# Patient Record
Sex: FEMALE | Race: WHITE | NOT HISPANIC OR LATINO | Employment: FULL TIME | URBAN - METROPOLITAN AREA
[De-identification: names, ages, dates, MRNs, and addresses within clinical notes are randomized per-mention and may not be internally consistent; named-entity substitution may affect disease eponyms.]

---

## 2024-02-06 ENCOUNTER — OFFICE VISIT (OUTPATIENT)
Dept: CARDIOLOGY CLINIC | Facility: CLINIC | Age: 69
End: 2024-02-06
Payer: COMMERCIAL

## 2024-02-06 VITALS
HEART RATE: 72 BPM | SYSTOLIC BLOOD PRESSURE: 160 MMHG | WEIGHT: 113.5 LBS | DIASTOLIC BLOOD PRESSURE: 90 MMHG | BODY MASS INDEX: 18.91 KG/M2 | HEIGHT: 65 IN

## 2024-02-06 DIAGNOSIS — I71.21 ANEURYSM OF ASCENDING AORTA WITHOUT RUPTURE (HCC): Primary | ICD-10-CM

## 2024-02-06 PROCEDURE — 93000 ELECTROCARDIOGRAM COMPLETE: CPT | Performed by: INTERNAL MEDICINE

## 2024-02-06 PROCEDURE — 99203 OFFICE O/P NEW LOW 30 MIN: CPT | Performed by: INTERNAL MEDICINE

## 2024-02-06 RX ORDER — AZITHROMYCIN 250 MG/1
TABLET, FILM COATED ORAL
COMMUNITY
Start: 2024-02-01 | End: 2024-02-06

## 2024-02-06 NOTE — PROGRESS NOTES
Cardiology Follow Up    Ignacia Del Rosario  1955  [id number]  [facility] [place]  [address] [address] [phone]    1. Aneurysm of ascending aorta without rupture (HCC)  POCT ECG    Echo complete w/ contrast if indicated    Lipid panel    CT chest w contrast          Discussion: We will obtain a follow-up CT scan with contrast in 6 months.  We will also obtain an echo to look for a bicuspid valve and also to make sure there is no structural disease to account for her left axis deviation and ST segment deviation on ECG.  I suspect that these are all benign.  A lipid panel will also be obtained.  I will see him in 6 months after the CT scan.    Cardiovascular History:   Ms. Del Rosario recently underwent CT scan of the chest because of exposure to silica and was found to have dilation of the ascending aorta to 4.5 cm on CT scan done at [facility].  She believes that a number of years ago a scan was performed that disclosed normal aortic size, but records of that study are not available.  She denies chest discomfort or dyspnea.  She has been quite active in the past but has not exercised regularly in the past several years.  She does not have hypertension, diabetes, and has never smoked.  Her lipid status is not known.  There is a history of palpitations in the past.  Evaluation was apparently unremarkable. Her baseline ECG did show left axis deviation and nonspecific ST segment changes.  Pretty impressive recently had valley sock  She does have a history of Raynaud's.  She is not currently on any medication.    She is under a great deal of stress at work and had extensive legal issues with an ex- that caused her significant financial stress.    Patient Active Problem List   Diagnosis    Aneurysm of ascending aorta without rupture (HCC)     No past medical history on file.  Social History      Socioeconomic History    Marital status:      Spouse name: Not on file    Number of children: Not on file    Years of education: Not on file    Highest education level: Not on file   Occupational History    Not on file   Tobacco Use    Smoking status: Not on file    Smokeless tobacco: Not on file   Substance and Sexual Activity    Alcohol use: Not on file    Drug use: Not on file    Sexual activity: Not on file   Other Topics Concern    Not on file   Social History Narrative    Not on file     Social Determinants of Health     Financial Resource Strain: Not on file   Food Insecurity: Not on file   Transportation Needs: Not on file   Physical Activity: Not on file   Stress: Not on file   Social Connections: Not on file   Intimate Partner Violence: Not on file   Housing Stability: Not on file      No family history on file.  No past surgical history on file.    Current Outpatient Medications:     azithromycin (ZITHROMAX) 250 mg tablet, Take 2 tablets by mouth on day one; then one tablet daily for 4 days. (Patient not taking: Reported on 2/6/2024), Disp: , Rfl:   Allergies   Allergen Reactions    Penicillins Edema and Swelling    Tetracycline Hives     Other reaction(s): permanent-skin changes-spots    Ciprofloxacin Swelling and Rash       Imaging: No results found.    Review of Systems:  ROS    Vitals:    02/06/24 1553   BP: 160/90   Pulse: 72     Weight (last 2 days)       Date/Time Weight    02/06/24 1553 51.5 (113.5)            Physical Exam:  Physical Exam    Ruiz Giordano MD

## 2024-04-22 ENCOUNTER — APPOINTMENT (EMERGENCY)
Dept: CT IMAGING | Facility: HOSPITAL | Age: 69
End: 2024-04-22
Payer: COMMERCIAL

## 2024-04-22 ENCOUNTER — HOSPITAL ENCOUNTER (EMERGENCY)
Facility: HOSPITAL | Age: 69
Discharge: HOME | End: 2024-04-22
Attending: EMERGENCY MEDICINE
Payer: COMMERCIAL

## 2024-04-22 VITALS
RESPIRATION RATE: 16 BRPM | SYSTOLIC BLOOD PRESSURE: 151 MMHG | DIASTOLIC BLOOD PRESSURE: 90 MMHG | OXYGEN SATURATION: 100 % | TEMPERATURE: 98.1 F | HEART RATE: 65 BPM

## 2024-04-22 DIAGNOSIS — R03.0 ELEVATED BLOOD PRESSURE READING: ICD-10-CM

## 2024-04-22 DIAGNOSIS — R10.9 ACUTE ABDOMINAL PAIN: Primary | ICD-10-CM

## 2024-04-22 LAB
ALBUMIN SERPL BCP-MCNC: 4.3 G/DL (ref 3.5–5)
ALP SERPL-CCNC: 65 U/L (ref 34–104)
ALT SERPL W P-5'-P-CCNC: 14 U/L (ref 7–52)
ANION GAP SERPL CALCULATED.3IONS-SCNC: 9 MMOL/L (ref 4–13)
AST SERPL W P-5'-P-CCNC: 27 U/L (ref 13–39)
ATRIAL RATE: 69 BPM
BACTERIA UR QL AUTO: NORMAL /HPF
BASOPHILS # BLD AUTO: 0.03 THOUSANDS/ÂΜL (ref 0–0.1)
BASOPHILS NFR BLD AUTO: 0 % (ref 0–1)
BILIRUB SERPL-MCNC: 0.58 MG/DL (ref 0.2–1)
BILIRUB UR QL STRIP: NEGATIVE
BUN SERPL-MCNC: 18 MG/DL (ref 5–25)
CALCIUM SERPL-MCNC: 9.6 MG/DL (ref 8.4–10.2)
CHLORIDE SERPL-SCNC: 103 MMOL/L (ref 96–108)
CLARITY UR: CLEAR
CO2 SERPL-SCNC: 26 MMOL/L (ref 21–32)
COLOR UR: ABNORMAL
CREAT SERPL-MCNC: 0.89 MG/DL (ref 0.6–1.3)
EOSINOPHIL # BLD AUTO: 0.03 THOUSAND/ÂΜL (ref 0–0.61)
EOSINOPHIL NFR BLD AUTO: 0 % (ref 0–6)
ERYTHROCYTE [DISTWIDTH] IN BLOOD BY AUTOMATED COUNT: 12.7 % (ref 11.6–15.1)
GFR SERPL CREATININE-BSD FRML MDRD: 67 ML/MIN/1.73SQ M
GLUCOSE SERPL-MCNC: 80 MG/DL (ref 65–140)
GLUCOSE UR STRIP-MCNC: NEGATIVE MG/DL
HCT VFR BLD AUTO: 38.2 % (ref 34.8–46.1)
HGB BLD-MCNC: 12.3 G/DL (ref 11.5–15.4)
HGB UR QL STRIP.AUTO: NEGATIVE
IMM GRANULOCYTES # BLD AUTO: 0.02 THOUSAND/UL (ref 0–0.2)
IMM GRANULOCYTES NFR BLD AUTO: 0 % (ref 0–2)
KETONES UR STRIP-MCNC: NEGATIVE MG/DL
LEUKOCYTE ESTERASE UR QL STRIP: NEGATIVE
LYMPHOCYTES # BLD AUTO: 1.73 THOUSANDS/ÂΜL (ref 0.6–4.47)
LYMPHOCYTES NFR BLD AUTO: 24 % (ref 14–44)
MCH RBC QN AUTO: 32.3 PG (ref 26.8–34.3)
MCHC RBC AUTO-ENTMCNC: 32.2 G/DL (ref 31.4–37.4)
MCV RBC AUTO: 100 FL (ref 82–98)
MONOCYTES # BLD AUTO: 0.45 THOUSAND/ÂΜL (ref 0.17–1.22)
MONOCYTES NFR BLD AUTO: 6 % (ref 4–12)
NEUTROPHILS # BLD AUTO: 5.05 THOUSANDS/ÂΜL (ref 1.85–7.62)
NEUTS SEG NFR BLD AUTO: 70 % (ref 43–75)
NITRITE UR QL STRIP: NEGATIVE
NON-SQ EPI CELLS URNS QL MICRO: NORMAL /HPF
NRBC BLD AUTO-RTO: 0 /100 WBCS
P AXIS: 64 DEGREES
PH UR STRIP.AUTO: 5.5 [PH]
PLATELET # BLD AUTO: 225 THOUSANDS/UL (ref 149–390)
PMV BLD AUTO: 8.8 FL (ref 8.9–12.7)
POTASSIUM SERPL-SCNC: 3.6 MMOL/L (ref 3.5–5.3)
PR INTERVAL: 148 MS
PROT SERPL-MCNC: 7 G/DL (ref 6.4–8.4)
PROT UR STRIP-MCNC: ABNORMAL MG/DL
QRS AXIS: -69 DEGREES
QRSD INTERVAL: 90 MS
QT INTERVAL: 418 MS
QTC INTERVAL: 447 MS
RBC # BLD AUTO: 3.81 MILLION/UL (ref 3.81–5.12)
RBC #/AREA URNS AUTO: NORMAL /HPF
SODIUM SERPL-SCNC: 138 MMOL/L (ref 135–147)
SP GR UR STRIP.AUTO: 1.02 (ref 1–1.03)
T WAVE AXIS: 15 DEGREES
UROBILINOGEN UR STRIP-ACNC: <2 MG/DL
VENTRICULAR RATE: 69 BPM
WBC # BLD AUTO: 7.31 THOUSAND/UL (ref 4.31–10.16)
WBC #/AREA URNS AUTO: NORMAL /HPF

## 2024-04-22 PROCEDURE — 99285 EMERGENCY DEPT VISIT HI MDM: CPT | Performed by: EMERGENCY MEDICINE

## 2024-04-22 PROCEDURE — 80053 COMPREHEN METABOLIC PANEL: CPT | Performed by: EMERGENCY MEDICINE

## 2024-04-22 PROCEDURE — 99284 EMERGENCY DEPT VISIT MOD MDM: CPT

## 2024-04-22 PROCEDURE — 81001 URINALYSIS AUTO W/SCOPE: CPT | Performed by: EMERGENCY MEDICINE

## 2024-04-22 PROCEDURE — 85025 COMPLETE CBC W/AUTO DIFF WBC: CPT | Performed by: EMERGENCY MEDICINE

## 2024-04-22 PROCEDURE — 36415 COLL VENOUS BLD VENIPUNCTURE: CPT | Performed by: EMERGENCY MEDICINE

## 2024-04-22 PROCEDURE — 93005 ELECTROCARDIOGRAM TRACING: CPT

## 2024-04-22 PROCEDURE — 93010 ELECTROCARDIOGRAM REPORT: CPT | Performed by: INTERNAL MEDICINE

## 2024-04-22 PROCEDURE — 74177 CT ABD & PELVIS W/CONTRAST: CPT

## 2024-04-22 RX ADMIN — IOHEXOL 70 ML: 350 INJECTION, SOLUTION INTRAVENOUS at 10:03

## 2024-04-22 NOTE — DISCHARGE INSTRUCTIONS
You were evaluated in the emergency department for: abdominal pain. You can access your current and pending results through [facility]'s [employer]. A radiologist will take a second look at your X-Rays, if you had any, and you will be contacted with any new findings.     You should follow-up with your primary care provider, as soon as possible, for re-evaluation.  If you do not have a primary care provider, I have referred you to [facility]. You will be contacted about scheduling an appointment. Their phone number is also included on this paperwork.     Your workup revealed no emergent features at this time; however, many disease processes are dynamic:    Please, return to the emergency department if you experience new or worsening symptoms, fever, chest pain, shortness of breath, difficulty breathing, dizziness, abdominal pain, persistent nausea/vomiting, syncope or passing out, blood in your urine or stool, coughing up blood, leg swelling/pain, urinary retention, bowel or bladder incontinence, numbness between your legs.    You may take 650mg of tylenol every four to six hours, not exceeding 3,000mg daily, for the management of your discomfort. You may also take ibuprofen, 400mg every six to eight hours.      Additionally, your blood pressure was measured to be high. This is something that you should discuss with your primary care provider and have re-checked within one week.

## 2024-04-22 NOTE — ED PROVIDER NOTES
History  Chief Complaint   Patient presents with    Abdominal Pain     Pt reports low abd pain that feels like gas since last PM but normal gas relief efforts have not helped     Patient is a 69-year-old female, with a history significant for IBS per my review of medical record, who presents to the ED today for evaluation of atraumatic, nonmigratory, nonradiating, sudden onset, sharp in character, right lower quadrant abdominal pain.  There is associated sensation of gas/increased burping.  Patient continues to pass flatus.  Patient states she still has her appendix.  Patient attempted Gas-X to remit her symptoms without effect.  Touch exacerbates her symptoms.  There is no associated fever, dyspnea, chest pain, dysuria, polyuria, back pain.  Patient is without other concerns at this time.        None       History reviewed. No pertinent past medical history.    History reviewed. No pertinent surgical history.    History reviewed. No pertinent family history.  I have reviewed and agree with the history as documented.    E-Cigarette/Vaping     E-Cigarette/Vaping Substances     Social History     Tobacco Use    Smoking status: Never    Smokeless tobacco: Never       Review of Systems   Constitutional:  Negative for fever.   HENT:  Negative for trouble swallowing.    Eyes:  Negative for visual disturbance.   Respiratory:  Negative for shortness of breath.    Cardiovascular:  Negative for chest pain.   Gastrointestinal:  Positive for abdominal pain. Negative for blood in stool.   Endocrine: Negative for polyuria.   Genitourinary:  Negative for dysuria.   Musculoskeletal:  Negative for back pain and gait problem.   Skin:  Negative for rash.   Allergic/Immunologic: Positive for environmental allergies.   Neurological:  Negative for weakness and numbness.   Hematological:  Negative for adenopathy.   Psychiatric/Behavioral:  Negative for confusion.    All other systems reviewed and are negative.      Physical Exam  Physical  Exam  Vitals and nursing note reviewed.   Constitutional:       General: She is not in acute distress.     Appearance: She is not toxic-appearing.   HENT:      Head: Normocephalic and atraumatic.      Right Ear: External ear normal.      Left Ear: External ear normal.      Nose: Nose normal. No rhinorrhea.      Mouth/Throat:      Mouth: Mucous membranes are moist.      Pharynx: Oropharynx is clear. No oropharyngeal exudate or posterior oropharyngeal erythema.      Comments: Uvula midline. No oropharyngeal or submandibular mass/swelling  Eyes:      General:         Right eye: No discharge.         Left eye: No discharge.   Neck:      Comments: Patient is spontaneously rotating their neck to the left and right during the history and physical exam interaction without difficulty or apparent discomfort    Cardiovascular:      Rate and Rhythm: Normal rate and regular rhythm.      Pulses: Normal pulses.      Heart sounds: Normal heart sounds. No murmur heard.     No friction rub. No gallop.      Comments: 2+ Radial  Pulmonary:      Effort: Pulmonary effort is normal. No respiratory distress.      Breath sounds: Normal breath sounds. No stridor. No wheezing, rhonchi or rales.   Abdominal:      General: Abdomen is flat. There is no distension.      Palpations: Abdomen is soft.      Tenderness: There is abdominal tenderness. There is no right CVA tenderness, left CVA tenderness, guarding or rebound.      Comments: Tenderness palpation over McBurney's point in the suprapubic region   Musculoskeletal:         General: No deformity.      Cervical back: Neck supple. No tenderness. No muscular tenderness.   Lymphadenopathy:      Cervical: No cervical adenopathy.   Skin:     General: Skin is warm and dry.      Capillary Refill: Capillary refill takes less than 2 seconds.   Neurological:      Mental Status: She is alert.      Comments: Patient is speaking clearly in complete sentences.  Patient is answering appropriately and able  follow commands.  Patient is moving all four extremities spontaneously.  No facial droop.  Tongue midline.      Psychiatric:         Mood and Affect: Mood normal.         Behavior: Behavior normal.         Vital Signs  ED Triage Vitals   Temperature Pulse Respirations Blood Pressure SpO2   04/22/24 0812 04/22/24 0809 04/22/24 0809 04/22/24 0809 04/22/24 0809   98.1 °F (36.7 °C) 86 16 (!) 178/107 99 %      Temp Source Heart Rate Source Patient Position - Orthostatic VS BP Location FiO2 (%)   04/22/24 0812 04/22/24 0930 04/22/24 0809 04/22/24 0809 --   Oral Monitor Sitting Right arm       Pain Score       --                  Vitals:    04/22/24 0809 04/22/24 0930   BP: (!) 178/107 151/90   Pulse: 86 65   Patient Position - Orthostatic VS: Sitting          Visual Acuity      ED Medications  Medications   iohexol (OMNIPAQUE) 350 MG/ML injection (MULTI-DOSE) 70 mL (70 mL Intravenous Given 4/22/24 1003)       Diagnostic Studies  Results Reviewed       Procedure Component Value Units Date/Time    Comprehensive metabolic panel [[id number]] Collected: 04/22/24 0900    Lab Status: Final result Specimen: Blood from Arm, Left Updated: 04/22/24 0945     Sodium 138 mmol/L      Potassium 3.6 mmol/L      Chloride 103 mmol/L      CO2 26 mmol/L      ANION GAP 9 mmol/L      BUN 18 mg/dL      Creatinine 0.89 mg/dL      Glucose 80 mg/dL      Calcium 9.6 mg/dL      AST 27 U/L      ALT 14 U/L      Alkaline Phosphatase 65 U/L      Total Protein 7.0 g/dL      Albumin 4.3 g/dL      Total Bilirubin 0.58 mg/dL      eGFR 67 ml/min/1.73sq m     Narrative:      National Kidney Disease Foundation guidelines for Chronic Kidney Disease (CKD):     Stage 1 with normal or high GFR (GFR > 90 mL/min/1.73 square meters)    Stage 2 Mild CKD (GFR = 60-89 mL/min/1.73 square meters)    Stage 3A Moderate CKD (GFR = 45-59 mL/min/1.73 square meters)    Stage 3B Moderate CKD (GFR = 30-44 mL/min/1.73 square meters)    Stage 4 Severe CKD (GFR = 15-29 mL/min/1.73  square meters)    Stage 5 End Stage CKD (GFR <15 mL/min/1.73 square meters)  Note: GFR calculation is accurate only with a steady state creatinine    CBC and differential [[id number]]  (Abnormal) Collected: 04/22/24 0900    Lab Status: Final result Specimen: Blood from Arm, Left Updated: 04/22/24 0928     WBC 7.31 Thousand/uL      RBC 3.81 Million/uL      Hemoglobin 12.3 g/dL      Hematocrit 38.2 %       fL      MCH 32.3 pg      MCHC 32.2 g/dL      RDW 12.7 %      MPV 8.8 fL      Platelets 225 Thousands/uL      nRBC 0 /100 WBCs      Segmented % 70 %      Immature Grans % 0 %      Lymphocytes % 24 %      Monocytes % 6 %      Eosinophils Relative 0 %      Basophils Relative 0 %      Absolute Neutrophils 5.05 Thousands/µL      Absolute Immature Grans 0.02 Thousand/uL      Absolute Lymphocytes 1.73 Thousands/µL      Absolute Monocytes 0.45 Thousand/µL      Eosinophils Absolute 0.03 Thousand/µL      Basophils Absolute 0.03 Thousands/µL     Urine Microscopic [[id number]]  (Normal) Collected: 04/22/24 0853    Lab Status: Final result Specimen: Urine, Clean Catch Updated: 04/22/24 0923     RBC, UA 1-2 /hpf      WBC, UA 1-2 /hpf      Epithelial Cells Occasional /hpf      Bacteria, UA None Seen /hpf     UA w Reflex to Microscopic w Reflex to Culture [[id number]]  (Abnormal) Collected: 04/22/24 0853    Lab Status: Final result Specimen: Urine, Clean Catch Updated: 04/22/24 0923     Color, UA Light Yellow     Clarity, UA Clear     Specific Gravity, UA 1.018     pH, UA 5.5     Leukocytes, UA Negative     Nitrite, UA Negative     Protein, UA 50 (1+) mg/dl      Glucose, UA Negative mg/dl      Ketones, UA Negative mg/dl      Urobilinogen, UA <2.0 mg/dl      Bilirubin, UA Negative     Occult Blood, UA Negative                   CT abdomen pelvis with contrast   Final Result by Manuelito Hardy MD (04/22 1025)      No acute abnormality in the abdomen or pelvis.         Workstation performed: [id number]                     Procedures  Procedures         ED Course  ED Course as of 04/22/24 1546   Mon Apr 22, 2024   0859 ECG per my independent interpretation: Normal rate, regular rhythm, no ectopy, left axis, no ST elevations    0925 Bacteria, UA: None Seen  WNL   1050 The patient reports improvement in symptoms on reevaluation.  Questions answered to her satisfaction.  Patient states she has no PCP: Referral placed                               SBIRT 22yo+      Flowsheet Row Most Recent Value   Initial Alcohol Screen: US AUDIT-C     1. How often do you have a drink containing alcohol? 0 Filed at: 04/22/2024 0910   2. How many drinks containing alcohol do you have on a typical day you are drinking?  0 Filed at: 04/22/2024 0910   3b. FEMALE Any Age, or MALE 65+: How often do you have 4 or more drinks on one occassion? 0 Filed at: 04/22/2024 0910   Audit-C Score 0 Filed at: 04/22/2024 0910   DINO: How many times in the past year have you...    Used an illegal drug or used a prescription medication for non-medical reasons? Never Filed at: 04/22/2024 0910                      Medical Decision Making  Patient is a 69-year-old female, with a history significant for IBS per my review of medical record, who presents to the ED today for evaluation of atraumatic, nonmigratory, nonradiating, sudden onset, sharp in character, right lower quadrant abdominal pain.  There is associated sensation of gas/increased burping.  Patient continues to pass flatus.  Patient states she still has her appendix.  Patient attempted Gas-X to remit her symptoms without effect.  Touch exacerbates her symptoms.  There is no associated fever, dyspnea, chest pain, dysuria, polyuria, back pain.  Patient is currently afebrile and hemodynamically stable.  Her physical exam is notable for clear heart and lungs, soft abdomen with tenderness in the right lower quadrant.  This presentation is concerning for: Acute appendicitis, anxiety, IBS.  I also considered AAA.  Low  suspicion for ovarian pathology, SBO at this time based on history physical exam.  Patient at risk for UTI.  Will investigate with CT of abdomen pelvis, ECG, CBC, CMP, UA.  Will manage based on workup as patient does not desire analgesia at this time.    Amount and/or Complexity of Data Reviewed  Labs: ordered. Decision-making details documented in ED Course.  Radiology: ordered.    Risk  Prescription drug management.             Disposition  Final diagnoses:   Acute abdominal pain   Elevated blood pressure reading     Time reflects when diagnosis was documented in both MDM as applicable and the Disposition within this note       Time User Action Codes Description Comment    4/22/2024  8:43 AM Cisco Hickey Add [R10.9] Acute abdominal pain     4/22/2024  8:43 AM Cisco Hickey Add [R03.0] Elevated blood pressure reading           ED Disposition       ED Disposition   Discharge    Condition   Stable    Date/Time   Mon Apr 22, 2024 1030    Comment   Ignacia Del Rosario discharge to home/self care.                   Follow-up Information       Follow up With Specialties Details Why Contact Info Additional Information    [facility] Schedule an appointment as soon as possible for a visit   [address] [address] [phone] [facility], [address], [place], [address]   [phone]    [facility] Schedule an appointment as soon as possible for a visit   [address] [address] [phone] [facility], [address], [place], [address], [phone]            There are no discharge medications for this patient.          PDMP Review       None            ED Provider  Electronically Signed by              Cisco Hickey MD  04/22/24 9212

## 2024-04-22 NOTE — ED NOTES
Pt educated to take urine sample cup to restroom d/t needing to urinate. Pt did not take cup to bathroom with her.      Aleshia Nettles RN  04/22/24 8950

## 2024-04-22 NOTE — Clinical Note
Ignacia Del Rosario was seen and treated in our emergency department on 4/22/2024.                Diagnosis:     Ignacia  is off the rest of the shift today.    She may return on this date:          If you have any questions or concerns, please don't hesitate to call.      Cisco Hickey MD    ______________________________           _______________          _______________  Hospital Representative                              Date                                Time

## 2024-04-30 ENCOUNTER — NEW PATIENT COMPREHENSIVE (OUTPATIENT)
Dept: URBAN - METROPOLITAN AREA CLINIC 6 | Facility: CLINIC | Age: 69
End: 2024-04-30

## 2024-04-30 DIAGNOSIS — H43.811: ICD-10-CM

## 2024-04-30 DIAGNOSIS — H40.013: ICD-10-CM

## 2024-04-30 DIAGNOSIS — H53.19: ICD-10-CM

## 2024-04-30 PROCEDURE — 92004 COMPRE OPH EXAM NEW PT 1/>: CPT

## 2024-04-30 PROCEDURE — 92250 FUNDUS PHOTOGRAPHY W/I&R: CPT

## 2024-04-30 ASSESSMENT — TONOMETRY
OD_IOP_MMHG: 22
OS_IOP_MMHG: 22
OD_IOP_MMHG: 24
OS_IOP_MMHG: 21

## 2024-04-30 ASSESSMENT — VISUAL ACUITY
OD_CC: 20/25-1
OS_CC: 20/20

## 2024-06-07 ENCOUNTER — APPOINTMENT (OUTPATIENT)
Dept: RADIOLOGY | Facility: AMBULARY SURGERY CENTER | Age: 69
End: 2024-06-07
Attending: ORTHOPAEDIC SURGERY
Payer: COMMERCIAL

## 2024-06-07 ENCOUNTER — OFFICE VISIT (OUTPATIENT)
Dept: OBGYN CLINIC | Facility: CLINIC | Age: 69
End: 2024-06-07
Payer: COMMERCIAL

## 2024-06-07 VITALS — WEIGHT: 113 LBS | HEIGHT: 65 IN | BODY MASS INDEX: 18.83 KG/M2

## 2024-06-07 DIAGNOSIS — M79.642 LEFT HAND PAIN: ICD-10-CM

## 2024-06-07 DIAGNOSIS — M79.642 LEFT HAND PAIN: Primary | ICD-10-CM

## 2024-06-07 DIAGNOSIS — M06.9 RHEUMATOID ARTHRITIS INVOLVING LEFT HAND, UNSPECIFIED WHETHER RHEUMATOID FACTOR PRESENT (HCC): ICD-10-CM

## 2024-06-07 PROCEDURE — 99204 OFFICE O/P NEW MOD 45 MIN: CPT | Performed by: ORTHOPAEDIC SURGERY

## 2024-06-07 PROCEDURE — 73130 X-RAY EXAM OF HAND: CPT

## 2024-06-07 NOTE — PROGRESS NOTES
Assessment:  1. Left hand pain  XR hand 3+ vw left    Ambulatory Referral to Rheumatology      2. Rheumatoid arthritis involving left hand, unspecified whether rheumatoid factor present (HCC)  C-reactive protein    Sedimentation rate, automated    Cyclic citrul peptide antibody, IgG    Ambulatory Referral to Rheumatology        Patient Active Problem List   Diagnosis    Aneurysm of ascending aorta without rupture (HCC)           Plan      69 y.o. female with left MCP joint swelling without mechanism of injury, suggestive of Rheumatoid Arthritis  Diagnostics reviewed and physical exam performed.  Diagnosis, treatment options and associated risks were discussed with the patient including no treatment, nonsurgical treatment and potential for surgical intervention.  The patient was given the opportunity to ask questions regarding each.  Lab work ordered to evaluate for inflammatory condition  Referral placed today for Ignacia to follow up with Rheumatology for further evaluation and treatment  Continue with activities as tolerated using pain as a guide for when to modify or discontinue  Return for follow up on an as-needed basis (PRN).          Subjective:     Patient ID: Ignacia Del Rosario 69 y.o. female    HPI    Patient presents today for initial evaluation of left hand swelling and pain that began a few months ago with no injury, trauma, and treatment.  She notices patient is aggravated with increased activity.  Denies previous injury, surgery, trauma to the left hand.  She notes intermittent cracking sensation that is nonpainful.  Denies paresthesias.  Does not use any brace or assistive device on the hand. She is left hand dominant. She notes she is generally very active with heavy use of her hands. She has a history of other autoimmune disorders and her mother has a history of Rheumatoid arthritis, and wonders if this hand swelling is related to that.     The following portions of the patient's history were reviewed  "and updated as appropriate: allergies, current medications, past family history, past social history, past surgical history and problem list.      Objective:    Review of Systems  Pertinent items are noted in HPI.  All other systems were reviewed and are negative.    History reviewed. No pertinent past medical history.    History reviewed. No pertinent surgical history.    History reviewed. No pertinent family history.    Social History     Occupational History    Not on file   Tobacco Use    Smoking status: Never    Smokeless tobacco: Never   Substance and Sexual Activity    Alcohol use: Not on file    Drug use: Not on file    Sexual activity: Not on file       No current outpatient medications on file.    Allergies   Allergen Reactions    Penicillins Edema and Swelling    Tetracycline Hives     Other reaction(s): permanent-skin changes-spots    Ciprofloxacin Swelling and Rash       Physical Exam  Ht 5' 5\" (1.651 m)   Wt 51.3 kg (113 lb)   BMI 18.80 kg/m²   Cons: Appears well.  No apparent distress.  Psych: Alert. Oriented x3.  Mood and affect normal.  Eyes: PERRLA, EOMI  Resp: Normal effort.  No audible wheezing or stridor.  CV: Palpable pulse.  No discernable arrhythmia.  No LE edema.  Lymph:  No palpable cervical, axillary, or inguinal lymphadenopathy.  Skin: Warm.  No palpable masses.  No visible lesions.  Neuro: Normal muscle tone.  Normal and symmetric DTR's.    Ortho Exam  Left hand:  No observable deformity, ecchymosis  Mild swelling dorsal aspect of 2nd and 3rd MCP joints  No tenderness with palpation  ROM full and pain free  FDS and FDP intact and 5/5  Brisk capillary refill  Sensation intact to Ax/R/M/U nerve distributions      Procedures  No Procedures performed today      I have personally reviewed pertinent films in PACS and my interpretation is x-ray of left hand obtained today reviewed and demonstrates: no acute osseous abnormalities, minimal degenerative changes .      Scribe Attestation      I,: " " Mariaa Monae am acting as a scribe while in the presence of the attending physician.:       I,:  Pastor Xie, DO personally performed the services described in this documentation    as scribed in my presence.:                Portions of the record may have been created with voice recognition software.  Occasional wrong word or \"sound a like\" substitutions may have occurred due to the inherent limitations of voice recognition software.  Read the chart carefully and recognize, using context, where substitutions have occurred.  "

## 2024-06-25 NOTE — PROGRESS NOTES
Ambulatory Visit  Name: Ignacia Del Rosario      : 1955      MRN: [id number]  Encounter Provider: Ro Leyva PA-C  Encounter Date: 2024   Encounter department: [facility] [place]    Assessment & Plan   1. Varicose veins of both legs with edema  Assessment & Plan:  -Progressively worsening pedal and LE swelling particularly in the past year  -Risk factors include: varicose veins, daily 3-hour ride in the car and decreased exercise  -No Hx DVT. No leg pain or disability    Exam:     Mild pedal edema 1-2+ ankle and distal lower leg swelling    Skin overall healthy and intact; few small spiders at the ankles; no chronic stasis changes    Overall small, scattered spider and reticular varicosities - mostly 2 mm wide over both legs; noted few, small segments of Varicosities up to 5 mm    Discussion: We had a detailed discussion regarding varicose veins and the treatment of venous disease. We discussed the role of compression and other conservative measures for relief of leg swelling.  We discussed that treatment centers upon compressive measures.  She is provided prescription for compression stockings.  If she continues to have symptoms, we can reevaluate her as she may have venous insufficiency related to her varicose veins.  In the meantime, she asked to go to lymphedema therapy for manual massage and drainage.    Order for prescription graded compression stockings of 20-30 mm Hg  Compression stockings, periodic leg elevation, low sodium diet; regular exercise   Patient education for venous insufficiency  No high risk features and suspect leg swelling due to vv  Recommend PCP for routine care, prevention, as well as there may be contributing factors to edema  Follow up as needed for continued or worsening swelling  Orders:  -     Compression Stocking  -     Ambulatory Referral to PT/OT Lymphedema Therapy; Future  2. Localized swelling of both lower legs  -     Ambulatory Referral to Vascular  "Surgery    CC: New patient, self-referred for leg swelling and presents today for evaluation. Reports 1 year h/o b/l ankle and foot swelling. Not wearing compression.     History of Present Illness     Ignacia Del Rosario is a 69 y.o. female rheumatoid arthritis of the L hand, ascending aortic aneurysm who presents for evaluation of leg swelling.       Patient reports that in the past 1 to 2-year, she has had progressively worsening intermittent pedal and lower extremity edema.  At times, her toes are like \"sausages.\"  More recently, she has had daily ankle and foot swelling which has been concerning to her.    Historically, she has had intermittent episodes of leg swelling but very rarely.  Now things are more persistent.  The legs do fully resolve overnight.  She does elevate her legs.  She has tried compression stockings in the past but feels they did not help and less her legs uncomfortable.  She also reports that the stockings are too tight.    For the past 3 years she has had a job in [facility] which requires 3 hours roundtrip for work and as a result, she is not been able to perform her usual exercise regimen.  She has been working on increasing her activity.    She does have small varicose veins.  She reports they have not been worsening over the years.  She has no leg pain.  No leg heaviness or tightness.    No history of DVT.    Mom has varicose veins.    She has no associated chest pain or shortness of breath.    We discussed that her 3 hours in the car on top of decreased exercise may be contributing to leg swelling.  Recommend compression stockings, but she did not have a good experience with OTC compression.  She does ask for referral for lymphedema clinic which was provided to her.  Also, she does not have a primary care since [facility].  Recommend primary care as there may be other factors contributing to leg swelling, as well as for routine preventative measures.    Review of Systems   Constitutional: " "Negative.    HENT: Negative.     Eyes: Negative.    Respiratory: Negative.     Cardiovascular:  Positive for leg swelling.   Gastrointestinal: Negative.    Endocrine: Negative.    Genitourinary: Negative.    Musculoskeletal: Negative.    Skin: Negative.    Allergic/Immunologic: Negative.    Neurological: Negative.    Hematological: Negative.    Psychiatric/Behavioral: Negative.         Objective     /90 (BP Location: Left arm, Patient Position: Sitting)   Pulse 92   Ht 5' 5\" (1.651 m)   Wt 51.3 kg (113 lb)   BMI 18.80 kg/m²     2+ femoral and DP pulses    2+ Ankle and distal legs    1+ distal LE and ankle edema. Small varicose veins, few up to 2 mm              Physical Exam  Vitals and nursing note reviewed.   Constitutional:       Appearance: She is well-developed.   HENT:      Head: Normocephalic and atraumatic.   Eyes:      Pupils: Pupils are equal, round, and reactive to light.   Neck:      Thyroid: Thyromegaly present.   Cardiovascular:      Rate and Rhythm: Normal rate and regular rhythm.      Pulses:           Radial pulses are 2+ on the right side.        Dorsalis pedis pulses are 2+ on the right side and 2+ on the left side.      Heart sounds: Normal heart sounds, S1 normal and S2 normal. No murmur heard.     No friction rub. No gallop.   Pulmonary:      Effort: Pulmonary effort is normal. No accessory muscle usage or respiratory distress.      Breath sounds: Normal breath sounds. No wheezing or rales.   Abdominal:      General: Bowel sounds are normal. There is no distension.      Palpations: Abdomen is soft.      Tenderness: There is no abdominal tenderness.   Musculoskeletal:         General: No deformity. Normal range of motion.      Right lower le+ Pitting Edema present.      Left lower le+ Pitting Edema present.   Skin:     General: Skin is warm and dry.      Findings: No lesion or rash.      Nails: There is no clubbing.   Neurological:      Mental Status: She is alert and oriented " "to person, place, and time.      Comments: Grossly normal    Psychiatric:         Behavior: Behavior is cooperative.         Administrative Statements           I have reviewed and made appropriate changes to the review of systems input by the medical assistant.    Vitals:    06/26/24 1402   BP: 146/90   BP Location: Left arm   Patient Position: Sitting   Pulse: 92   Weight: 51.3 kg (113 lb)   Height: 5' 5\" (1.651 m)       Patient Active Problem List   Diagnosis    Aneurysm of ascending aorta without rupture (HCC)    Left hand pain    Rheumatoid arthritis involving left hand (HCC)    Varicose veins of both legs with edema    Localized swelling of both lower legs       History reviewed. No pertinent surgical history.    History reviewed. No pertinent family history.    Social History     Socioeconomic History    Marital status: Single     Spouse name: Not on file    Number of children: Not on file    Years of education: Not on file    Highest education level: Not on file   Occupational History    Not on file   Tobacco Use    Smoking status: Never    Smokeless tobacco: Never   Vaping Use    Vaping status: Unknown   Substance and Sexual Activity    Alcohol use: Not on file    Drug use: Not on file    Sexual activity: Not on file   Other Topics Concern    Not on file   Social History Narrative    Not on file     Social Determinants of Health     Financial Resource Strain: Not on file   Food Insecurity: Not on file   Transportation Needs: Not on file   Physical Activity: Not on file   Stress: Not on file   Social Connections: Not on file   Intimate Partner Violence: Not on file   Housing Stability: Not on file       Allergies   Allergen Reactions    Penicillins Edema and Swelling    Tetracycline Hives     Other reaction(s): permanent-skin changes-spots    Ciprofloxacin Swelling and Rash       No current outpatient medications on file.          "

## 2024-06-26 ENCOUNTER — OFFICE VISIT (OUTPATIENT)
Dept: VASCULAR SURGERY | Facility: CLINIC | Age: 69
End: 2024-06-26
Payer: COMMERCIAL

## 2024-06-26 VITALS
BODY MASS INDEX: 18.83 KG/M2 | WEIGHT: 113 LBS | HEART RATE: 92 BPM | HEIGHT: 65 IN | SYSTOLIC BLOOD PRESSURE: 146 MMHG | DIASTOLIC BLOOD PRESSURE: 90 MMHG

## 2024-06-26 DIAGNOSIS — I83.893 VARICOSE VEINS OF BOTH LEGS WITH EDEMA: Primary | ICD-10-CM

## 2024-06-26 DIAGNOSIS — R22.43 LOCALIZED SWELLING OF BOTH LOWER LEGS: ICD-10-CM

## 2024-06-26 PROCEDURE — 99204 OFFICE O/P NEW MOD 45 MIN: CPT | Performed by: PHYSICIAN ASSISTANT

## 2024-06-26 NOTE — ASSESSMENT & PLAN NOTE
-Progressively worsening pedal and LE swelling particularly in the past year  -Risk factors include: varicose veins, daily 3-hour ride in the car and decreased exercise  -No Hx DVT. No leg pain or disability    Exam:     Mild pedal edema 1-2+ ankle and distal lower leg swelling    Skin overall healthy and intact; few small spiders at the ankles; no chronic stasis changes    Overall small, scattered spider and reticular varicosities - mostly 2 mm wide over both legs; noted few, small segments of Varicosities up to 5 mm    Discussion: We had a detailed discussion regarding varicose veins and the treatment of venous disease. We discussed the role of compression and other conservative measures for relief of leg swelling.  We discussed that treatment centers upon compressive measures.  She is provided prescription for compression stockings.  If she continues to have symptoms, we can reevaluate her as she may have venous insufficiency related to her varicose veins.  In the meantime, she asked to go to lymphedema therapy for manual massage and drainage.    Order for prescription graded compression stockings of 20-30 mm Hg  Compression stockings, periodic leg elevation, low sodium diet; regular exercise   Patient education for venous insufficiency  No high risk features and suspect leg swelling due to vv  Recommend PCP for routine care, prevention, as well as there may be contributing factors to edema  Follow up as needed for continued or worsening swelling

## 2024-06-26 NOTE — PATIENT INSTRUCTIONS
Venous Insufficiency    We had a detailed discussion regarding varicose veins and the treatment of venous disease. We discussed the role of compression and other conservative measures for relief of symptoms related to varicose veins.     Order for prescription graded compression stockings of 20-30 mm Hg  Wear stocking EVERY day to help control swelling in legs and remove at night  Elevate legs while at rest  Continue healthy life-style changes; heart-healthy, low sodium diet; regular exercise   Patient education for venous insufficiency.  Follow up as needed

## 2024-07-19 ENCOUNTER — APPOINTMENT (OUTPATIENT)
Dept: URGENT CARE | Facility: MEDICAL CENTER | Age: 69
End: 2024-07-19